# Patient Record
Sex: FEMALE | URBAN - METROPOLITAN AREA
[De-identification: names, ages, dates, MRNs, and addresses within clinical notes are randomized per-mention and may not be internally consistent; named-entity substitution may affect disease eponyms.]

---

## 2023-04-11 LAB
ALANINE AMINOTRANSFERASE (SGPT) (U/L) IN SER/PLAS: 17 U/L (ref 7–45)
ALBUMIN (G/DL) IN SER/PLAS: 4.4 G/DL (ref 3.4–5)
ALKALINE PHOSPHATASE (U/L) IN SER/PLAS: 55 U/L (ref 33–136)
ANION GAP IN SER/PLAS: 11 MMOL/L (ref 10–20)
APPEARANCE, URINE: CLEAR
ASPARTATE AMINOTRANSFERASE (SGOT) (U/L) IN SER/PLAS: 19 U/L (ref 9–39)
BILIRUBIN TOTAL (MG/DL) IN SER/PLAS: 0.5 MG/DL (ref 0–1.2)
BILIRUBIN, URINE: NEGATIVE
BLOOD, URINE: NEGATIVE
CALCIDIOL (25 OH VITAMIN D3) (NG/ML) IN SER/PLAS: 34 NG/ML
CALCIUM (MG/DL) IN SER/PLAS: 9.9 MG/DL (ref 8.6–10.6)
CARBON DIOXIDE, TOTAL (MMOL/L) IN SER/PLAS: 28 MMOL/L (ref 21–32)
CHLORIDE (MMOL/L) IN SER/PLAS: 103 MMOL/L (ref 98–107)
CHOLESTEROL (MG/DL) IN SER/PLAS: 220 MG/DL (ref 0–199)
CHOLESTEROL IN HDL (MG/DL) IN SER/PLAS: 74.2 MG/DL
CHOLESTEROL/HDL RATIO: 3
COLOR, URINE: YELLOW
CREATININE (MG/DL) IN SER/PLAS: 0.66 MG/DL (ref 0.5–1.05)
ERYTHROCYTE DISTRIBUTION WIDTH (RATIO) BY AUTOMATED COUNT: 12.8 % (ref 11.5–14.5)
ERYTHROCYTE MEAN CORPUSCULAR HEMOGLOBIN CONCENTRATION (G/DL) BY AUTOMATED: 32.5 G/DL (ref 32–36)
ERYTHROCYTE MEAN CORPUSCULAR VOLUME (FL) BY AUTOMATED COUNT: 97 FL (ref 80–100)
ERYTHROCYTES (10*6/UL) IN BLOOD BY AUTOMATED COUNT: 4.56 X10E12/L (ref 4–5.2)
ESTIMATED AVERAGE GLUCOSE FOR HBA1C: 117 MG/DL
GFR FEMALE: >90 ML/MIN/1.73M2
GLUCOSE (MG/DL) IN SER/PLAS: 99 MG/DL (ref 74–99)
GLUCOSE, URINE: NEGATIVE MG/DL
HEMATOCRIT (%) IN BLOOD BY AUTOMATED COUNT: 44.3 % (ref 36–46)
HEMOGLOBIN (G/DL) IN BLOOD: 14.4 G/DL (ref 12–16)
HEMOGLOBIN A1C/HEMOGLOBIN TOTAL IN BLOOD: 5.7 %
KETONES, URINE: NEGATIVE MG/DL
LDL: 130 MG/DL (ref 0–99)
LEUKOCYTE ESTERASE, URINE: NEGATIVE
LEUKOCYTES (10*3/UL) IN BLOOD BY AUTOMATED COUNT: 6.9 X10E9/L (ref 4.4–11.3)
NITRITE, URINE: NEGATIVE
NRBC (PER 100 WBCS) BY AUTOMATED COUNT: 0 /100 WBC (ref 0–0)
PH, URINE: 7 (ref 5–8)
PLATELETS (10*3/UL) IN BLOOD AUTOMATED COUNT: 337 X10E9/L (ref 150–450)
POTASSIUM (MMOL/L) IN SER/PLAS: 4.4 MMOL/L (ref 3.5–5.3)
PROTEIN TOTAL: 7 G/DL (ref 6.4–8.2)
PROTEIN, URINE: NEGATIVE MG/DL
SODIUM (MMOL/L) IN SER/PLAS: 138 MMOL/L (ref 136–145)
SPECIFIC GRAVITY, URINE: 1.01 (ref 1–1.03)
THYROTROPIN (MIU/L) IN SER/PLAS BY DETECTION LIMIT <= 0.05 MIU/L: 2.1 MIU/L (ref 0.44–3.98)
THYROXINE (T4) FREE (NG/DL) IN SER/PLAS: 1.01 NG/DL (ref 0.78–1.48)
TRIGLYCERIDE (MG/DL) IN SER/PLAS: 77 MG/DL (ref 0–149)
URATE (MG/DL) IN SER/PLAS: 5 MG/DL (ref 2.3–6.7)
UREA NITROGEN (MG/DL) IN SER/PLAS: 13 MG/DL (ref 6–23)
UROBILINOGEN, URINE: <2 MG/DL (ref 0–1.9)
VLDL: 15 MG/DL (ref 0–40)

## 2023-04-12 LAB — URINE CULTURE: NO GROWTH

## 2023-05-03 LAB
ANTI-DNA (DS): 3 IU/ML
C REACTIVE PROTEIN (MG/L) IN SER/PLAS: <0.1 MG/DL
RHEUMATOID FACTOR (IU/ML) IN SERUM OR PLASMA: <10 IU/ML (ref 0–15)
SEDIMENTATION RATE, ERYTHROCYTE: 8 MM/H (ref 0–30)

## 2023-05-04 LAB
ANA PATTERN: ABNORMAL
ANA TITER: ABNORMAL
ANTI-CENTROMERE: <0.2 AI
ANTI-CHROMATIN: <0.2 AI
ANTI-DNA (DS): 3 IU/ML
ANTI-JO-1 IGG: <0.2 AI
ANTI-NUCLEAR ANTIBODY (ANA): POSITIVE
ANTI-RIBOSOMAL P: <0.2 AI
ANTI-RNP: 0.6 AI
ANTI-SCL-70: <0.2 AI
ANTI-SM/RNP: <0.2 AI
ANTI-SM: <0.2 AI
ANTI-SSA: <0.2 AI
ANTI-SSB: <0.2 AI

## 2023-10-02 PROBLEM — Z79.890 LONG-TERM CURRENT USE OF TESTOSTERONE REPLACEMENT THERAPY: Status: ACTIVE | Noted: 2023-10-02

## 2023-10-02 PROBLEM — N95.2 VAGINAL ATROPHY: Status: ACTIVE | Noted: 2023-10-02

## 2023-10-02 PROBLEM — B00.9 HERPES SIMPLEX: Status: ACTIVE | Noted: 2023-10-02

## 2023-10-02 PROBLEM — R93.2 ABNORMAL LIVER SCAN: Status: ACTIVE | Noted: 2023-10-02

## 2023-10-02 PROBLEM — E78.2 HYPERLIPEMIA, MIXED: Status: ACTIVE | Noted: 2023-10-02

## 2023-10-02 PROBLEM — M85.89 OSTEOPENIA OF MULTIPLE SITES: Status: ACTIVE | Noted: 2020-10-12

## 2023-10-02 PROBLEM — M54.50 LOW BACK PAIN: Status: ACTIVE | Noted: 2023-10-02

## 2023-10-02 PROBLEM — R73.03 PRE-DIABETES: Status: ACTIVE | Noted: 2020-03-19

## 2023-10-02 PROBLEM — N95.1 MENOPAUSAL SYNDROME ON HORMONE REPLACEMENT THERAPY: Status: ACTIVE | Noted: 2023-10-02

## 2023-10-02 PROBLEM — R53.81 MALAISE: Status: ACTIVE | Noted: 2023-10-02

## 2023-10-02 PROBLEM — F32.A DEPRESSION: Status: ACTIVE | Noted: 2023-10-02

## 2023-10-02 PROBLEM — R53.83 FATIGUE: Status: ACTIVE | Noted: 2023-10-02

## 2023-10-02 PROBLEM — F41.9 ANXIETY: Status: ACTIVE | Noted: 2023-10-02

## 2023-10-02 PROBLEM — Z79.890 MENOPAUSAL SYNDROME ON HORMONE REPLACEMENT THERAPY: Status: ACTIVE | Noted: 2023-10-02

## 2023-10-02 PROBLEM — R31.29 HEMATURIA, MICROSCOPIC: Status: ACTIVE | Noted: 2023-10-02

## 2023-10-02 PROBLEM — S13.9XXA NECK SPRAIN: Status: ACTIVE | Noted: 2023-10-02

## 2023-10-02 PROBLEM — M25.551 CHRONIC PAIN OF BOTH HIPS: Status: ACTIVE | Noted: 2023-10-02

## 2023-10-02 PROBLEM — G89.29 CHRONIC PAIN OF BOTH HIPS: Status: ACTIVE | Noted: 2023-10-02
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
## 2023-10-02 PROBLEM — M25.552 CHRONIC PAIN OF BOTH HIPS: Status: ACTIVE | Noted: 2023-10-02

## 2023-10-02 RX ORDER — ESTRIOL MICRONIZED 100 %
1 POWDER (GRAM) MISCELLANEOUS
COMMUNITY

## 2023-10-02 RX ORDER — TESTOSTERONE 20.25 MG/1.25G
5 GEL TOPICAL
COMMUNITY

## 2023-10-02 RX ORDER — IMIQUIMOD 12.5 MG/.25G
CREAM TOPICAL
COMMUNITY
Start: 2022-11-25

## 2023-10-02 RX ORDER — DICLOFENAC SODIUM 75 MG/1
1 TABLET, DELAYED RELEASE ORAL 2 TIMES DAILY
COMMUNITY
Start: 2022-11-18

## 2023-10-02 RX ORDER — CLOBETASOL PROPIONATE 0.5 MG/G
OINTMENT TOPICAL
COMMUNITY
Start: 2023-07-12

## 2023-10-02 RX ORDER — ESTRADIOL 2 MG/1
2.75 TABLET ORAL
COMMUNITY

## 2023-10-02 RX ORDER — FLASH GLUCOSE SENSOR
1 KIT MISCELLANEOUS
COMMUNITY
Start: 2020-12-11

## 2023-10-02 RX ORDER — PENICILLIN V POTASSIUM 500 MG/1
TABLET, FILM COATED ORAL
COMMUNITY
Start: 2023-09-27 | End: 2023-11-15 | Stop reason: WASHOUT

## 2023-10-02 RX ORDER — CHLORHEXIDINE GLUCONATE ORAL RINSE 1.2 MG/ML
15 SOLUTION DENTAL 2 TIMES DAILY
COMMUNITY
Start: 2023-09-27 | End: 2023-10-24 | Stop reason: ALTCHOICE

## 2023-10-02 RX ORDER — ESCITALOPRAM OXALATE 10 MG/1
1 TABLET ORAL DAILY
COMMUNITY
Start: 2019-01-18 | End: 2023-11-15 | Stop reason: WASHOUT

## 2023-10-02 RX ORDER — VALACYCLOVIR HYDROCHLORIDE 500 MG/1
1 TABLET, FILM COATED ORAL DAILY
COMMUNITY
Start: 2018-06-22

## 2023-10-02 RX ORDER — ACETAMINOPHEN 500 MG
5000 TABLET ORAL EVERY MORNING
COMMUNITY

## 2023-10-02 RX ORDER — TRIAMCINOLONE ACETONIDE 40 MG/ML
INJECTION, SUSPENSION INTRA-ARTICULAR; INTRAMUSCULAR
COMMUNITY
Start: 2022-06-09

## 2023-10-02 RX ORDER — ACAR/CYST/ALA/Q10/P.SER/BROCC 800-300 MG
10 POWDER IN PACKET (EA) ORAL DAILY
COMMUNITY
Start: 2021-09-03

## 2023-10-05 ENCOUNTER — APPOINTMENT (OUTPATIENT)
Dept: PHYSICAL THERAPY | Facility: HOSPITAL | Age: 73
End: 2023-10-05
Payer: MEDICARE

## 2023-10-12 ENCOUNTER — LAB (OUTPATIENT)
Dept: LAB | Facility: LAB | Age: 73
End: 2023-10-12
Payer: MEDICARE

## 2023-10-12 DIAGNOSIS — R41.9 UNSPECIFIED SYMPTOMS AND SIGNS INVOLVING COGNITIVE FUNCTIONS AND AWARENESS: ICD-10-CM

## 2023-10-12 DIAGNOSIS — M85.80 OTHER SPECIFIED DISORDERS OF BONE DENSITY AND STRUCTURE, UNSPECIFIED SITE: ICD-10-CM

## 2023-10-12 DIAGNOSIS — R53.83 OTHER FATIGUE: Primary | ICD-10-CM

## 2023-10-13 ENCOUNTER — LAB (OUTPATIENT)
Dept: LAB | Facility: LAB | Age: 73
End: 2023-10-13
Payer: MEDICARE

## 2023-10-13 DIAGNOSIS — R53.83 OTHER FATIGUE: Primary | ICD-10-CM

## 2023-10-13 DIAGNOSIS — R53.83 OTHER FATIGUE: ICD-10-CM

## 2023-10-13 LAB
ERYTHROCYTE [DISTWIDTH] IN BLOOD BY AUTOMATED COUNT: 12.9 % (ref 11.5–14.5)
FERRITIN SERPL-MCNC: 87 NG/ML (ref 8–150)
HCT VFR BLD AUTO: 41.8 % (ref 36–46)
HGB BLD-MCNC: 13.9 G/DL (ref 12–16)
IRON SATN MFR SERPL: 38 % (ref 25–45)
IRON SERPL-MCNC: 131 UG/DL (ref 35–150)
MCH RBC QN AUTO: 32 PG (ref 26–34)
MCHC RBC AUTO-ENTMCNC: 33.3 G/DL (ref 32–36)
MCV RBC AUTO: 96 FL (ref 80–100)
NRBC BLD-RTO: 0 /100 WBCS (ref 0–0)
PLATELET # BLD AUTO: 332 X10*3/UL (ref 150–450)
PMV BLD AUTO: 10.3 FL (ref 7.5–11.5)
RBC # BLD AUTO: 4.35 X10*6/UL (ref 4–5.2)
TIBC SERPL-MCNC: 349 UG/DL (ref 240–445)
UIBC SERPL-MCNC: 218 UG/DL (ref 110–370)
VIT B12 SERPL-MCNC: 240 PG/ML (ref 211–911)
WBC # BLD AUTO: 7.6 X10*3/UL (ref 4.4–11.3)

## 2023-10-13 PROCEDURE — 83550 IRON BINDING TEST: CPT | Mod: WAIVER OF LIABILITY ON FILE

## 2023-10-13 PROCEDURE — 83540 ASSAY OF IRON: CPT | Mod: WAIVER OF LIABILITY ON FILE

## 2023-10-13 PROCEDURE — 36415 COLL VENOUS BLD VENIPUNCTURE: CPT

## 2023-10-13 PROCEDURE — 82607 VITAMIN B-12: CPT

## 2023-10-13 PROCEDURE — 82728 ASSAY OF FERRITIN: CPT | Mod: WAIVER OF LIABILITY ON FILE

## 2023-10-13 PROCEDURE — 85027 COMPLETE CBC AUTOMATED: CPT

## 2023-10-16 ENCOUNTER — APPOINTMENT (OUTPATIENT)
Dept: PHYSICAL THERAPY | Facility: HOSPITAL | Age: 73
End: 2023-10-16
Payer: MEDICARE

## 2023-10-17 NOTE — RESULT ENCOUNTER NOTE
Fitz Bee  Your vitamin b12 (even though listed in the normal range) is far too low. It could be contributing to your fatigue. I would like for you to start a vitamin b12 1000 mcg sub lingual or quick dissolve tablet daily in the morning. We will recheck this in a couple months to make sure it is improving.  Your other labs are normal. I did attempt to call you earlier today. Sorry to have missed you.   I look forward to seeing you in November.   Sidra Isidro MD PhD

## 2023-10-24 ENCOUNTER — TELEMEDICINE (OUTPATIENT)
Dept: INTEGRATIVE MEDICINE | Facility: CLINIC | Age: 73
End: 2023-10-24
Payer: MEDICARE

## 2023-10-24 DIAGNOSIS — R73.03 PRE-DIABETES: Primary | ICD-10-CM

## 2023-10-24 PROBLEM — E53.8 VITAMIN B12 DEFICIENCY: Status: ACTIVE | Noted: 2023-10-24

## 2023-10-24 PROCEDURE — 99215 OFFICE O/P EST HI 40 MIN: CPT | Performed by: INTERNAL MEDICINE

## 2023-10-24 ASSESSMENT — ENCOUNTER SYMPTOMS
BACK PAIN: 1
SLEEP DISTURBANCE: 0
COUGH: 0
DIFFICULTY URINATING: 0
APPETITE CHANGE: 0
MYALGIAS: 0
SHORTNESS OF BREATH: 0
DYSURIA: 0
FATIGUE: 0
HEADACHES: 0
NERVOUS/ANXIOUS: 0
ARTHRALGIAS: 0
WEAKNESS: 0
FEVER: 0

## 2023-10-24 NOTE — ASSESSMENT & PLAN NOTE
Patient has EXTREMELY low vitamin b12. Tried to impress importance of taking b12 daily. Recommend recheck in 3 months. Wonder if her falls might have been somewhat related to subtle proprioceptive issues form her b12 deficiency.   Reviewed other labs.

## 2023-10-24 NOTE — PATIENT INSTRUCTIONS
Suggest retest vitamin b12 in 3 months.  Keep regular with taking your vitamin d.   Follow up in 3 months.  Consume a cup of beans /lentils daily.  Focus on whole grains  Sidra Isidro MD PhD

## 2023-10-24 NOTE — ASSESSMENT & PLAN NOTE
Counseling and discussion about more fiber and less saturated fat/cholesterol in the diet. Discussed how fiber balances blood sugar.   Continue healthy exercise program and getting enough sleep.   Optimize vitamin d level.

## 2023-10-24 NOTE — PROGRESS NOTES
Integrative Medicine Follow-up Visit :     Subjective   Patient ID: Cristal Chester is a 72 y.o. female who presents for No chief complaint on file.     HPI  She had a root canal several days after seeing me. Concerned her prednisone pack may have influenced blood work. Very worried about her blood glucose. Has been all over the place in terms of hemoglobin A1c in last several years. Reminds me of strong family hx of diabetes.   She wants to talk about her vitamin b12   Wants to know if she needs to be tested for celiac.   She has a requisition to have her hemoglobin A1c and wants to know if her low b12 contributes to her  slightly high hemoglobin A1c.   Feels more bloated since eating more whole grains and beans since our last visit.   Stopped eating hard cheese. Continues with cream in her coffee.     Has a question about old blood work with Dr. Anderson. Was told she may not make enough insulin.   Eats a lot of salmon, fish on salad and wants to know if this is ok.            Pain:back pain still bothering her.     Review of Systems   Constitutional:  Negative for appetite change, fatigue and fever.   HENT:  Negative for congestion and hearing loss.    Eyes:  Negative for visual disturbance.   Respiratory:  Negative for cough and shortness of breath.    Cardiovascular:  Negative for chest pain and leg swelling.   Genitourinary:  Negative for difficulty urinating, dysuria and urgency.   Musculoskeletal:  Positive for back pain. Negative for arthralgias and myalgias.   Skin:  Negative for rash.   Neurological:  Negative for weakness and headaches.   Psychiatric/Behavioral:  Negative for behavioral problems and sleep disturbance. The patient is not nervous/anxious.                   Objective   There were no vitals taken for this visit.    Physical Exam  Constitutional:       General: She is not in acute distress.     Appearance: She is not ill-appearing, toxic-appearing or diaphoretic.   Pulmonary:      Effort:  Pulmonary effort is normal. No respiratory distress.   Musculoskeletal:         General: No deformity.      Cervical back: Normal range of motion.      Comments: Upper extremities normal range of motion grossly   Skin:     Coloration: Skin is not jaundiced or pale.      Findings: No rash.   Psychiatric:         Mood and Affect: Mood normal.         Behavior: Behavior normal.                      Assessment/Plan     Problem List Items Addressed This Visit             ICD-10-CM    Pre-diabetes - Primary R73.03     Counseling and discussion about more fiber and less saturated fat/cholesterol in the diet. Discussed how fiber balances blood sugar.   Continue healthy exercise program and getting enough sleep.   Optimize vitamin d level.               Recommend Follow up in : 3 months    Sidra Isidro MD PhD

## 2023-10-26 ENCOUNTER — APPOINTMENT (OUTPATIENT)
Dept: PHYSICAL THERAPY | Facility: HOSPITAL | Age: 73
End: 2023-10-26
Payer: MEDICARE

## 2023-11-01 ENCOUNTER — APPOINTMENT (OUTPATIENT)
Dept: INTEGRATIVE MEDICINE | Facility: CLINIC | Age: 73
End: 2023-11-01
Payer: MEDICARE

## 2023-11-02 ENCOUNTER — APPOINTMENT (OUTPATIENT)
Dept: PHYSICAL THERAPY | Facility: HOSPITAL | Age: 73
End: 2023-11-02
Payer: MEDICARE

## 2023-11-06 ENCOUNTER — TREATMENT (OUTPATIENT)
Dept: PHYSICAL THERAPY | Facility: HOSPITAL | Age: 73
End: 2023-11-06
Payer: MEDICARE

## 2023-11-06 DIAGNOSIS — M25.552 CHRONIC PAIN OF BOTH HIPS: ICD-10-CM

## 2023-11-06 DIAGNOSIS — M54.50 LOW BACK PAIN: Primary | ICD-10-CM

## 2023-11-06 DIAGNOSIS — G89.29 CHRONIC PAIN OF BOTH HIPS: ICD-10-CM

## 2023-11-06 DIAGNOSIS — M25.551 CHRONIC PAIN OF BOTH HIPS: ICD-10-CM

## 2023-11-06 PROCEDURE — 97110 THERAPEUTIC EXERCISES: CPT | Mod: GP | Performed by: PHYSICAL THERAPIST

## 2023-11-06 PROCEDURE — 97530 THERAPEUTIC ACTIVITIES: CPT | Mod: GP | Performed by: PHYSICAL THERAPIST

## 2023-11-06 ASSESSMENT — PAIN SCALES - GENERAL: PAINLEVEL_OUTOF10: 2

## 2023-11-06 ASSESSMENT — PAIN - FUNCTIONAL ASSESSMENT: PAIN_FUNCTIONAL_ASSESSMENT: 0-10

## 2023-11-06 NOTE — PROGRESS NOTES
Physical Therapy  Physical Therapy Treatment Note    Patient Name: Cristal Chester  MRN: [id number]  Today's Date: 11/6/2023  Time Calculation  Start Time: 1000  Stop Time: 1045  Time Calculation (min): 45 min    Insurance:  Visit number: 2 of 10  Authorization info: no auth   Insurance Type: medicare    General:  Reason for visit: chronic LBP and bilateral hip pain  Referred by: Dr. Prasanna Coffman MD    Current Problem  1. Low back pain        2. Chronic pain of both hips            Precautions: none      Subjective:     Patient reports she has cx her last 4 appointments secondary to having a root canal and had to travel for work. Pt has been working with [employer] [facility] and is supplementing B12 and feels as though her energy is improving. Pt reports she continues have bilateral lateral hip pain. Pt states she does not have pain with biking and yoga. Pt states walking for long periods of time continues to be uncomfortable.     Pain  Pain Assessment: 0-10  Pain Score: 2  Pain Location: Hip  Pain Orientation:  (bilateral)    Performing HEP?: Yes      Objective:   Hip strength  R: abduction 3+/5*  L:abduction 3+/5*  R:extension 3+/5  L:extension 3+/5    Pain with palpation to glute medius and minimus    Gait: bilateral trendelenburg       Treatment Performed:    Therapeutic Exercise:    15 min  Glute medius tendon loading program clamshell red TB 4 sets 12 reps each    Therapeutic Activity :    30 min  Majority of time spent on reassessment  Education on possible pathology  Education on tendon loading program  Education tendonesis vs tendonitis         Assessment:   Pt continues to present with impairments noted at initial eval. Pt has not been in the clinic secondary to dental procedure. Pt is going to start tendon loading program and follow up in 2 weeks      Plan:  Follow up in 2 weeks      Ben Cobian PT

## 2023-11-07 ASSESSMENT — ENCOUNTER SYMPTOMS
OCCASIONAL FEELINGS OF UNSTEADINESS: 0
LOSS OF SENSATION IN FEET: 0
DEPRESSION: 0

## 2023-11-13 ENCOUNTER — APPOINTMENT (OUTPATIENT)
Dept: INTEGRATIVE MEDICINE | Facility: CLINIC | Age: 73
End: 2023-11-13
Payer: MEDICARE

## 2023-11-15 ENCOUNTER — PATIENT MESSAGE (OUTPATIENT)
Dept: INTEGRATIVE MEDICINE | Facility: CLINIC | Age: 73
End: 2023-11-15

## 2023-11-15 ENCOUNTER — TELEMEDICINE (OUTPATIENT)
Dept: INTEGRATIVE MEDICINE | Facility: CLINIC | Age: 73
End: 2023-11-15
Payer: MEDICARE

## 2023-11-15 DIAGNOSIS — E53.8 VITAMIN B12 DEFICIENCY: Primary | ICD-10-CM

## 2023-11-15 DIAGNOSIS — M85.89 OSTEOPENIA OF MULTIPLE SITES: ICD-10-CM

## 2023-11-15 DIAGNOSIS — R73.03 PRE-DIABETES: Primary | ICD-10-CM

## 2023-11-15 PROCEDURE — 99215 OFFICE O/P EST HI 40 MIN: CPT | Performed by: INTERNAL MEDICINE

## 2023-11-15 ASSESSMENT — ENCOUNTER SYMPTOMS
APPETITE CHANGE: 0
FEVER: 0
MYALGIAS: 0
SHORTNESS OF BREATH: 0
DIFFICULTY URINATING: 0
BACK PAIN: 0
COUGH: 0
DYSURIA: 0
FATIGUE: 0
SLEEP DISTURBANCE: 0
ARTHRALGIAS: 0
WEAKNESS: 0
NERVOUS/ANXIOUS: 0
HEADACHES: 0

## 2023-11-15 NOTE — PATIENT INSTRUCTIONS
Increase vitamin b12 to 2000 mcg daily.   Check hemoglobin A1c  Consider seeing Dr. Parveen Higuera for back issues.   Kick Diabetes by Yanelis Mensah RD  Plan recheck vitamin b12 in 3 months.   Start taking vitamin d 5000 international units every other day.       Follow up 2 months.   Sidra Isidro MD PhD

## 2023-11-15 NOTE — PROGRESS NOTES
Integrative Medicine Follow-up Visit :     Subjective   Patient ID: Cristal Chester is a 72 y.o. female who presents for Fatigue and Prediabetes       Fatigue: a bit better since starting vitmain b12.   Still worried about her elevated blood sugar. Would like to know where she is starting from.   Pre-Diabetes: Still eat beans very regularly. Has hummus regularly. Was on a macrobiotic diet in the past. Does not tolerate oatmeal. She feels bloated and stuffed. Struggling with whole grains.     She is taking the vitamin b12 1000 mcg every morning under the tongue. No issues.     Still eating some cheese. Has decreased dairy by 80%. Eating more tofu.   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     Wants to know when to have hemoglobin A1c. Very worried she might not need the carbs that I am suggested that she eat.      Continues to exercise. Still serg a great deal of stiffness- she is working with PT who is trying to help her stretch her psoas muscle.   Pain:still stiff.     Review of Systems   Constitutional:  Negative for appetite change, fatigue and fever.   HENT:  Negative for congestion and hearing loss.    Eyes:  Negative for visual disturbance.   Respiratory:  Negative for cough and shortness of breath.    Cardiovascular:  Negative for chest pain and leg swelling.   Genitourinary:  Negative for difficulty urinating, dysuria and urgency.   Musculoskeletal:  Negative for arthralgias, back pain and myalgias.   Skin:  Negative for rash.   Neurological:  Negative for weakness and headaches.   Psychiatric/Behavioral:  Negative for behavioral problems and sleep disturbance. The patient is not nervous/anxious.                   Objective   There were no vitals taken for this visit.    Physical Exam  Constitutional:       General: She is not in acute distress.     Appearance: She is not ill-appearing, toxic-appearing or diaphoretic.   Pulmonary:      Effort: Pulmonary effort is normal. No respiratory distress.    Musculoskeletal:         General: No deformity.      Cervical back: Normal range of motion.      Comments: Upper extremities normal range of motion grossly   Skin:     Coloration: Skin is not jaundiced or pale.      Findings: No rash.   Psychiatric:         Mood and Affect: Mood normal.         Behavior: Behavior normal.                      Assessment/Plan     Problem List Items Addressed This Visit             ICD-10-CM    Osteopenia of multiple sites M85.89    Relevant Orders    Vitamin D 25-Hydroxy,Total (for eval of Vitamin D levels)    Pre-diabetes - Primary R73.03    Relevant Orders    Hemoglobin A1C         Recommend Follow up in : 2-3 months.    Sidra Isidro MD PhD

## 2023-11-20 ENCOUNTER — TREATMENT (OUTPATIENT)
Dept: PHYSICAL THERAPY | Facility: HOSPITAL | Age: 73
End: 2023-11-20
Payer: MEDICARE

## 2023-11-20 ENCOUNTER — LAB (OUTPATIENT)
Dept: LAB | Facility: LAB | Age: 73
End: 2023-11-20
Payer: MEDICARE

## 2023-11-20 ENCOUNTER — TELEPHONE (OUTPATIENT)
Dept: INTERNAL MEDICINE | Facility: HOSPITAL | Age: 73
End: 2023-11-20

## 2023-11-20 DIAGNOSIS — M54.50 LOW BACK PAIN: Primary | ICD-10-CM

## 2023-11-20 DIAGNOSIS — G89.29 CHRONIC PAIN OF BOTH HIPS: ICD-10-CM

## 2023-11-20 DIAGNOSIS — R73.03 PRE-DIABETES: ICD-10-CM

## 2023-11-20 DIAGNOSIS — E53.8 VITAMIN B12 DEFICIENCY: ICD-10-CM

## 2023-11-20 DIAGNOSIS — M25.552 CHRONIC PAIN OF BOTH HIPS: ICD-10-CM

## 2023-11-20 DIAGNOSIS — M85.89 OSTEOPENIA OF MULTIPLE SITES: ICD-10-CM

## 2023-11-20 DIAGNOSIS — M25.551 CHRONIC PAIN OF BOTH HIPS: ICD-10-CM

## 2023-11-20 DIAGNOSIS — R73.03 PRE-DIABETES: Primary | ICD-10-CM

## 2023-11-20 LAB
25(OH)D3 SERPL-MCNC: 53 NG/ML (ref 30–100)
EST. AVERAGE GLUCOSE BLD GHB EST-MCNC: 108 MG/DL
HBA1C MFR BLD: 5.4 %

## 2023-11-20 PROCEDURE — 83036 HEMOGLOBIN GLYCOSYLATED A1C: CPT

## 2023-11-20 PROCEDURE — 97530 THERAPEUTIC ACTIVITIES: CPT | Mod: GP | Performed by: PHYSICAL THERAPIST

## 2023-11-20 PROCEDURE — 36415 COLL VENOUS BLD VENIPUNCTURE: CPT

## 2023-11-20 PROCEDURE — 97110 THERAPEUTIC EXERCISES: CPT | Mod: GP | Performed by: PHYSICAL THERAPIST

## 2023-11-20 PROCEDURE — 82306 VITAMIN D 25 HYDROXY: CPT

## 2023-11-20 RX ORDER — CYANOCOBALAMIN 1000 UG/ML
INJECTION, SOLUTION INTRAMUSCULAR; SUBCUTANEOUS
Qty: 12 ML | Refills: 0 | Status: SHIPPED | OUTPATIENT
Start: 2023-11-20 | End: 2024-02-20

## 2023-11-20 ASSESSMENT — PAIN - FUNCTIONAL ASSESSMENT: PAIN_FUNCTIONAL_ASSESSMENT: 0-10

## 2023-11-20 ASSESSMENT — PAIN SCALES - GENERAL: PAINLEVEL_OUTOF10: 2

## 2023-11-20 NOTE — PROGRESS NOTES
"  Physical Therapy  Physical Therapy Treatment Note    Patient Name: Cristal Chester  MRN: [id number]  Today's Date: 11/20/2023       Insurance:  Visit number: 3 of 10  Authorization info: no auth   Insurance Type: medicare    General:  Reason for visit: chronic LBP and bilateral hip pain  Referred by: Dr. Prasanna Coffman MD    Current Problem  1. Low back pain        2. Chronic pain of both hips              Precautions: none      Subjective:   Pt arrived to therapy reporting she does not feel as though she is worse but maybe not better. Pt states she was sore following glute medius loading with clamshells. Pt reports she was able to go through the routine about 90% prescribed due to soreness.     Pain  Pain Assessment: 0-10  Pain Score: 2    Performing HEP?: Yes      Objective:   Hip strength  R: abduction 3+/5*  L:abduction 3+/5*  R:extension 3+/5  L:extension 3+/5    Pain with palpation to glute medius and minimus    Gait: bilateral trendelenburg       Treatment Performed:    Therapeutic Exercise:    25 min  Glute medius tendon loading program clamshell red TB 4 sets 12 reps each review  Single leg bridge 3x8 each  Table top 90/90 core isometric 5\" hold    Therapeutic Activity :    15 min  Majority of time spent on reassessment  Education on possible pathology  Education on tendon loading program  Education tendonesis vs tendonitis         Assessment:   Pt felt improvement in familiar discomfort following table top core exercise. We are going to continue to work core and pelvic stability with posterior chain activation to improve pain and overall function    Plan:  Follow up in 2 weeks      Ben Cobian, PT   "

## 2023-11-20 NOTE — TELEPHONE ENCOUNTER
----- Message from Franchesca Recinos MA sent at 11/20/2023 11:56 AM EST -----  Regarding: b-12 and hga1c  Patient called and the A!C test that is in her chart is for future(feb) Can you please put a new order in for one for today. The blood was drawn already . Also she would like the B-12 injections called in to her pharmacy     I have put in another order for hemoglobin A1c. I had asked the patient at the visit to determine if the vitamin b12 injections would be covered prior to ordering them. From her other direct message to me, it looks like she has not called to determine this. Would appreciate if patient could determine if she can get them prior to me ordering them. However, given that she has asked, I will order them.   Sidra Isidro MD PhD

## 2023-11-21 NOTE — RESULT ENCOUNTER NOTE
Your hemoglobin A1c has improved a bit since last checked. Your vitamin d is in an ideal level. Let me know if you have questions.   Sidra Isidro MD PhD

## 2023-12-02 RX ORDER — CYANOCOBALAMIN 1000 UG/ML
1000 INJECTION, SOLUTION INTRAMUSCULAR; SUBCUTANEOUS
Qty: 4 ML | Refills: 1 | Status: SHIPPED | OUTPATIENT
Start: 2023-12-02 | End: 2024-01-31

## 2023-12-04 ENCOUNTER — TREATMENT (OUTPATIENT)
Dept: PHYSICAL THERAPY | Facility: HOSPITAL | Age: 73
End: 2023-12-04
Payer: MEDICARE

## 2023-12-04 DIAGNOSIS — M54.50 LOW BACK PAIN: Primary | ICD-10-CM

## 2023-12-04 DIAGNOSIS — M25.552 CHRONIC PAIN OF BOTH HIPS: ICD-10-CM

## 2023-12-04 DIAGNOSIS — M25.551 CHRONIC PAIN OF BOTH HIPS: ICD-10-CM

## 2023-12-04 DIAGNOSIS — G89.29 CHRONIC PAIN OF BOTH HIPS: ICD-10-CM

## 2023-12-04 PROCEDURE — 97110 THERAPEUTIC EXERCISES: CPT | Mod: GP | Performed by: PHYSICAL THERAPIST

## 2023-12-04 PROCEDURE — 97140 MANUAL THERAPY 1/> REGIONS: CPT | Mod: GP | Performed by: PHYSICAL THERAPIST

## 2023-12-04 ASSESSMENT — PAIN - FUNCTIONAL ASSESSMENT: PAIN_FUNCTIONAL_ASSESSMENT: 0-10

## 2023-12-04 ASSESSMENT — PAIN SCALES - GENERAL: PAINLEVEL_OUTOF10: 1

## 2023-12-04 NOTE — PROGRESS NOTES
"  Physical Therapy  Physical Therapy Treatment Note    Patient Name: Cristal Chester  MRN: [id number]  Today's Date: 11/20/2023  Time Calculation  Start Time: 1005  Stop Time: 1050  Time Calculation (min): 45 min    Insurance:  Visit number: 4 of 10  Authorization info: no auth   Insurance Type: medicare    General:  Reason for visit: chronic LBP and bilateral hip pain  Referred by: Dr. Prasanna Coffman MD    Current Problem  1. Low back pain        2. Chronic pain of both hips                Precautions: none      Subjective:   Pt arrived to therapy reporting she is not feeling too bad today. Pt reports she has been more active since last visit. Pt has been doing yoga and noticed some balance issues and some knee pain. Pt has also been hiking which has been good. Pt had some \"body work\" done over the weekend    Pain  Pain Assessment: 0-10  Pain Score: 1    Performing HEP?: Yes      Objective:   Pain with lumbar flexion left SI      Treatment Performed:  Therapeutic Exercise:    25 min  STM adductor group and piriformis      Therapeutic Exercise:    15 min  Glute medius tendon loading program clamshell red TB 4 sets 12 reps each review  Single leg bridge 3x8 each  Table top 90/90 core isometric 5\" hold  90/90 hip openers on floor          Assessment:   Pt had less pain compared to previous session. Pt to continue to focus hip strength and hip mobility. Pt to follow up in 2 weeks with discharge    Plan:  Follow up in 2 weeks      Ben Cobian PT   "

## 2023-12-06 ENCOUNTER — APPOINTMENT (OUTPATIENT)
Dept: INTEGRATIVE MEDICINE | Facility: CLINIC | Age: 73
End: 2023-12-06
Payer: MEDICARE

## 2023-12-18 ENCOUNTER — TREATMENT (OUTPATIENT)
Dept: PHYSICAL THERAPY | Facility: HOSPITAL | Age: 73
End: 2023-12-18
Payer: MEDICARE

## 2023-12-18 DIAGNOSIS — G89.29 CHRONIC PAIN OF BOTH HIPS: ICD-10-CM

## 2023-12-18 DIAGNOSIS — M25.552 CHRONIC PAIN OF BOTH HIPS: ICD-10-CM

## 2023-12-18 DIAGNOSIS — M54.50 LOW BACK PAIN: Primary | ICD-10-CM

## 2023-12-18 DIAGNOSIS — M25.551 CHRONIC PAIN OF BOTH HIPS: ICD-10-CM

## 2023-12-18 PROCEDURE — 97110 THERAPEUTIC EXERCISES: CPT | Mod: GP | Performed by: PHYSICAL THERAPIST

## 2023-12-18 PROCEDURE — 97112 NEUROMUSCULAR REEDUCATION: CPT | Mod: GP | Performed by: PHYSICAL THERAPIST

## 2023-12-18 ASSESSMENT — PAIN - FUNCTIONAL ASSESSMENT: PAIN_FUNCTIONAL_ASSESSMENT: 0-10

## 2023-12-18 ASSESSMENT — PAIN SCALES - GENERAL: PAINLEVEL_OUTOF10: 0 - NO PAIN

## 2023-12-18 NOTE — PROGRESS NOTES
"  Physical Therapy  Physical Therapy Treatment Note    Patient Name: Cristal Chester  MRN: [id number]  Today's Date: 12/18/2023  Time Calculation  Start Time: 1000  Stop Time: 1045  Time Calculation (min): 45 min    Insurance:  Visit number: 5 of 10  Authorization info: no auth   Insurance Type: medicare    General:  Reason for visit: chronic LBP and bilateral hip pain  Referred by: Dr. Prasanna Coffman MD    Current Problem  1. Low back pain        2. Chronic pain of both hips                Precautions: none      Subjective:   Pt arrived to therapy reporting  she was going on a walk approximately 10 days ago. Pt went on a long walk. Pt states this was a longer walk that required a walk on [facility]. Pt states she had pain in bilateral hips and down legs. Pt reports she was able to walk about 10,000 step with not much pain. Pt reports compliance with HEP. Pt reports anti-inflammatories help to calm down pain    Pain  Pain Assessment: 0-10  Pain Score: 0 - No pain    Performing HEP?: Yes      Objective:         Treatment Performed:  NMR:    25 min  Long discussion on central sensitivity and pain science   Discussed movement patterns  Discussed reciprocal inhibition        Therapeutic Exercise:    15 min    Mostly verbal review of below  Glute medius tendon loading program clamshell red TB 4 sets 12 reps each review  Single leg bridge 3x8 each  Table top 90/90 core isometric 5\" hold  90/90 hip openers on floor  Modified side plank      Assessment:   Pt is progressing but slowly. Pt going out of town and POC will be put on hold until March 2024 when she returns      Plan:  hold      Ben Cobian PT   "

## 2024-03-07 ENCOUNTER — LAB (OUTPATIENT)
Dept: LAB | Facility: LAB | Age: 74
End: 2024-03-07
Payer: MEDICARE

## 2024-03-07 DIAGNOSIS — R73.03 PRE-DIABETES: ICD-10-CM

## 2024-03-07 DIAGNOSIS — E53.8 VITAMIN B12 DEFICIENCY: ICD-10-CM

## 2024-03-07 LAB
EST. AVERAGE GLUCOSE BLD GHB EST-MCNC: 103 MG/DL
HBA1C MFR BLD: 5.2 %
VIT B12 SERPL-MCNC: 1011 PG/ML (ref 211–911)

## 2024-03-07 PROCEDURE — 82607 VITAMIN B-12: CPT

## 2024-03-07 PROCEDURE — 36415 COLL VENOUS BLD VENIPUNCTURE: CPT

## 2024-03-07 PROCEDURE — 83036 HEMOGLOBIN GLYCOSYLATED A1C: CPT

## 2024-03-11 ENCOUNTER — APPOINTMENT (OUTPATIENT)
Dept: PHYSICAL THERAPY | Facility: HOSPITAL | Age: 74
End: 2024-03-11
Payer: MEDICARE

## 2024-03-12 ENCOUNTER — APPOINTMENT (OUTPATIENT)
Dept: INTEGRATIVE MEDICINE | Facility: CLINIC | Age: 74
End: 2024-03-12
Payer: MEDICARE

## 2024-03-12 ENCOUNTER — TELEMEDICINE (OUTPATIENT)
Dept: INTEGRATIVE MEDICINE | Facility: CLINIC | Age: 74
End: 2024-03-12
Payer: MEDICARE

## 2024-03-12 DIAGNOSIS — M25.552 CHRONIC PAIN OF BOTH HIPS: Primary | ICD-10-CM

## 2024-03-12 DIAGNOSIS — R73.03 PRE-DIABETES: ICD-10-CM

## 2024-03-12 DIAGNOSIS — G89.29 CHRONIC BILATERAL LOW BACK PAIN WITHOUT SCIATICA: ICD-10-CM

## 2024-03-12 DIAGNOSIS — M54.59 OTHER LOW BACK PAIN: ICD-10-CM

## 2024-03-12 DIAGNOSIS — M25.551 CHRONIC PAIN OF BOTH HIPS: Primary | ICD-10-CM

## 2024-03-12 DIAGNOSIS — M85.89 OSTEOPENIA OF MULTIPLE SITES: ICD-10-CM

## 2024-03-12 DIAGNOSIS — E78.2 HYPERLIPEMIA, MIXED: ICD-10-CM

## 2024-03-12 DIAGNOSIS — M54.50 CHRONIC BILATERAL LOW BACK PAIN WITHOUT SCIATICA: ICD-10-CM

## 2024-03-12 DIAGNOSIS — G89.29 CHRONIC PAIN OF BOTH HIPS: Primary | ICD-10-CM

## 2024-03-12 DIAGNOSIS — E53.8 VITAMIN B12 DEFICIENCY: ICD-10-CM

## 2024-03-12 PROCEDURE — 1126F AMNT PAIN NOTED NONE PRSNT: CPT | Performed by: INTERNAL MEDICINE

## 2024-03-12 PROCEDURE — 99215 OFFICE O/P EST HI 40 MIN: CPT | Performed by: INTERNAL MEDICINE

## 2024-03-12 ASSESSMENT — ENCOUNTER SYMPTOMS
FEVER: 0
WEAKNESS: 0
APPETITE CHANGE: 0
DIFFICULTY URINATING: 0
MYALGIAS: 0
COUGH: 0
DYSURIA: 0
SHORTNESS OF BREATH: 0
ARTHRALGIAS: 1
HEADACHES: 0
FATIGUE: 1
SLEEP DISTURBANCE: 0
BACK PAIN: 1
NERVOUS/ANXIOUS: 0

## 2024-03-12 NOTE — PATIENT INSTRUCTIONS
Continue the vitamin b12 1000- 2000 mcg sublingual daily. Complete the one remain b12 injection.  Recommend b12 be testing yearly.   Contact me if you begin to get a slump with your energy and we will restart the vitamin b12 injections 1-2 time per month  Start omega 3 DHA/EPA supplement daily.   Acupuncture for joint pain and low back pain.  [phone] for scheduling.   Follow up in six months. Recheck b12 level at this point.   See Dr. Sheri Isidro MD PhD

## 2024-03-12 NOTE — PROGRESS NOTES
Integrative Medicine Follow-up Visit :     Subjective   Patient ID: Cristal Chester is a 73 y.o. female who presents for No chief complaint on file.       HPI  Here to review b12 levels. Was taking the vitamin b12 once weekly as an injection. Also taking vitamin b12 2000 mcg sublingual. She noticed that her fatigue has improved.  No episodes of severe fatigue.  She gets tired at end of the day but it is different.       Continues to have hip and shoulder pain.     Continues to be primarily plant based but eats eggs, fish as she wants. Intuitive eating.   Eats rye bread on a regular basis. Eats lots of fruits and vegetables.        Pain:still pain in right shoulder and hips are still bothering. Continues to have low back pain which spreads to her hips. Very active. Would like to continue work with PT. Found one that she thinks is helpful at . Would like referral. Acupuncture has been helpful in the past for her low back pain, hip pain.  Went to acupuncturist outside our system.       Review of Systems   Constitutional:  Positive for fatigue (improved). Negative for appetite change and fever.   HENT:  Negative for congestion and hearing loss.    Eyes:  Negative for visual disturbance.   Respiratory:  Negative for cough and shortness of breath.    Cardiovascular:  Negative for chest pain and leg swelling.   Genitourinary:  Negative for difficulty urinating, dysuria and urgency.   Musculoskeletal:  Positive for arthralgias and back pain. Negative for myalgias.   Skin:  Negative for rash.   Neurological:  Negative for weakness and headaches.   Psychiatric/Behavioral:  Negative for behavioral problems and sleep disturbance. The patient is not nervous/anxious.                   Objective   There were no vitals taken for this visit.    Physical Exam  Constitutional:       General: She is not in acute distress.     Appearance: She is not ill-appearing, toxic-appearing or diaphoretic.   Pulmonary:      Effort: Pulmonary  effort is normal. No respiratory distress.   Musculoskeletal:         General: No deformity.      Cervical back: Normal range of motion.      Comments: Upper extremities normal range of motion grossly   Skin:     Coloration: Skin is not jaundiced or pale.      Findings: No rash.   Psychiatric:         Mood and Affect: Mood normal.         Behavior: Behavior normal.                      Assessment/Plan     Problem List Items Addressed This Visit             ICD-10-CM    Chronic pain of both hips - Primary M25.551, M25.552, G89.29    Relevant Orders    Referral to Physical Therapy    Hyperlipemia, mixed E78.2    Relevant Orders    Referral to Primary Care    Low back pain M54.50    Relevant Orders    Referral to Physical Therapy    Osteopenia of multiple sites M85.89    Relevant Orders    Referral to Primary Care    Pre-diabetes R73.03    Relevant Orders    Referral to Primary Care    Vitamin B12 deficiency E53.8     Stop weekly b12 injections after she completes this most recent dose. Level has reached optimal range. Suggest continue sublingual b12 and pt to message me if she notices her slumps in energy return. We can start again the b12 injections perhaps every 2-4 weeks to support her levels. Would check level again in six months to document that it is remaining between 700-1500 pg/ml.   Patient questions answered.   Given plant based diet recommend DHA/EPA supplement for brain health and possibly to improve omega 9/omega 3 ratios which may improve inflammation/pain.           Other Visit Diagnoses         Codes    Other low back pain     M54.59    Relevant Orders    Referral to [facility]              Recommend Follow up in : six months.     Sidra Isidro MD PhD    Time Spent  Prep time on day of patient encounter: 5 minutes  Time spent directly with patient, family or caregiver: 30 minutes  Additional Time Spent on Patient Care Activities: 0 minutes  Documentation Time: 5 minutes  Other Time Spent:  0 minutes  Total: 40 minutes                             operating room

## 2024-03-12 NOTE — ASSESSMENT & PLAN NOTE
Stop weekly b12 injections after she completes this most recent dose. Level has reached optimal range. Suggest continue sublingual b12 and pt to message me if she notices her slumps in energy return. We can start again the b12 injections perhaps every 2-4 weeks to support her levels. Would check level again in six months to document that it is remaining between 700-1500 pg/ml.   Patient questions answered.   Given plant based diet recommend DHA/EPA supplement for brain health and possibly to improve omega 9/omega 3 ratios which may improve inflammation/pain.

## 2024-04-02 ENCOUNTER — EVALUATION (OUTPATIENT)
Dept: PHYSICAL THERAPY | Facility: HOSPITAL | Age: 74
End: 2024-04-02
Payer: MEDICARE

## 2024-04-02 DIAGNOSIS — M54.50 CHRONIC BILATERAL LOW BACK PAIN WITHOUT SCIATICA: ICD-10-CM

## 2024-04-02 DIAGNOSIS — G89.29 CHRONIC BILATERAL LOW BACK PAIN WITHOUT SCIATICA: ICD-10-CM

## 2024-04-02 DIAGNOSIS — M25.551 CHRONIC PAIN OF BOTH HIPS: ICD-10-CM

## 2024-04-02 DIAGNOSIS — G89.29 CHRONIC PAIN OF BOTH HIPS: ICD-10-CM

## 2024-04-02 DIAGNOSIS — M25.552 CHRONIC PAIN OF BOTH HIPS: ICD-10-CM

## 2024-04-02 PROCEDURE — 97161 PT EVAL LOW COMPLEX 20 MIN: CPT | Mod: GP | Performed by: PHYSICAL THERAPIST

## 2024-04-02 PROCEDURE — 97110 THERAPEUTIC EXERCISES: CPT | Mod: GP | Performed by: PHYSICAL THERAPIST

## 2024-04-02 ASSESSMENT — PAIN SCALES - GENERAL: PAINLEVEL_OUTOF10: 1

## 2024-04-02 ASSESSMENT — PAIN - FUNCTIONAL ASSESSMENT: PAIN_FUNCTIONAL_ASSESSMENT: 0-10

## 2024-04-02 NOTE — PROGRESS NOTES
Physical Therapy  Physical Therapy Orthopedic Evaluation    Patient Name: Cristal Chester  MRN: [id number]  Today's Date: 4/3/2024  Time Calculation  Start Time: 1000  Stop Time: 1055  Time Calculation (min): 55 min    Insurance:  Visit number: 1 of 10  Authorization info: sign POC  Insurance Type: Medicare    General:  Reason for visit: bilateral hip pain  Referred by: Sidra Isidro MD PhD    Current Problem:  1. Chronic pain of both hips  Referral to Physical Therapy    Follow Up In Physical Therapy      2. Chronic bilateral low back pain without sciatica  Referral to Physical Therapy    Follow Up In Physical Therapy          Precautions: none  Precautions  STEADI Fall Risk Score (The score of 4 or more indicates an increased risk of falling): 0      Medical History Form: Reviewed (scanned into chart)    Subjective:     Chief Complaint: Patient presents to clinic as a formal patient with a chief complaint of bilateral hip pain. Pt also has complaint of right shoulder pain which will be addressed at a later date after she gets MRI. Pt is a past patient with similar complaints of bilateral lateral hip pain R>L. Pt reports she notices her pain mostly with long periods of walking. Pt states she last visit she no longer has catching in hip when going up stairs. Pt has not been in the clinic secondary to being in [place]  Onset Date: 12 years ago  MEME: Fall    Current Condition:   Better    Pain:  Pain Assessment: 0-10  Pain Score: 1  Pain Type: Chronic pain  Pain Location: Hip  Pain Orientation: Right, Left  Location: bilateral hips  Description: ache  Aggravating Factors:  Walking  Relieving Factors:  Rest and Stretching    Relevant Information (PMH & Previous Tests/Imaging): upcoming MRI of shoulder  Previous Interventions/Treatments: Physical Therapy    Prior Level of Function (PLOF)  Patient previously independent with all ADLs  Exercise/Physical Activity: active with walking, biking, yoga, swimming  Work/School:  therapist    Patients Living Environment: Reviewed and no concern    Primary Language: English    Patient's Goal(s) for Therapy: to alleviate pain with exercise    Red Flags: Do you have any of the following? No  Fever/chills, unexplained weight changes, dizziness/fainting, unexplained change in bowel or bladder functions, unexplained malaise or muscle weakness, night pain/sweats, numbness or tingling    Objective:    Hip Musculoskeletal Exam  Gait    Trendelenburg: right and left    Palpation    Right      Tenderness: present (glue medius and minimus, TFL, piriformis, adductors)    Left      Tenderness: present (glue medius and minimus, TFL, piriformis, adductor)    Range of Motion    Right      Passive flexion: 120.       Passive internal rotation: 40 (pain).       Passive external rotation: 45.       Passive abduction: 45.     Left      Passive flexion: 120.       Passive internal rotation: 40 (pain).       Passive external rotation: 45.       Passive abduction: 45.     Strength    Right      Extension: 4-/5.       Flexion: 4-/5.       Internal rotation: 4-/5. Internal rotation is affected by pain.       External rotation: 4-/5.       Abduction: 4-/5.     Left      Extension: 4-/5.       Flexion: 4-/5.       Internal rotation: 4-/5. Internal rotation is affected by pain.       External rotation: 4-/5.       Adduction: 4-/5.       Abduction: 4-/5.     Special Tests    Right      SELINA test (right): positive      Internal rotation: positive    Left      SELINA test (left): positive      Internal rotation: positive      Spine Musculoskeletal Exam    Gait    Trendelenburg: right and left    Range of Motion    Thoracolumbar       Flexion: normal.     Extension: normal.       Right      Lateral bending: normal.       Lateral rotation: normal.       Left      Lateral bending: normal.       Lateral rotation: normal.      Strength    Thoracolumbar       Right      Hip flexion: 4-/5.        Left      Hip flexion: 4-/5.        Hip adduction: 4-/5.      Special Tests    Thoracolumbar      Right      SELINA test: positive      Left      SELINA test: positive           Outcome Measures:  Other Measures  Oswestry Disablity Index (VIKTORIYA): 22       EDUCATION: Home exercise program, plan of care, activity modifications, pain management, and injury pathology       Goals: Set and discussed today  Active       PT Problem       PT Goal 1       Start:  04/03/24    Expected End:  05/29/24       By discharge, patient will:  Demonstrate independence with home exercise program  Tolerate increased exercise without adverse reaction  Demonstrate improved posture & proper body mechanics throughout session  Increase bilateral hip ROM to pain free + WNL  Increase bilateral hip strength to pain free + 5/5 throughout  Report decrease in pain by > 2 points to meet the MCID  Decrease score of Modified VIKTORIYA by > 6 points to meet the MCID  Perform ADLs without an increase symptoms  Ascend / descend stairs without an increase in symptoms  Ambulate x 2 miles without an increase in symptoms               Plan of care was developed with input and agreement by the patient    Treatment Performed:    Therapeutic Exercise:    15 min  Long discussion on possible pathologies and imporance of pelvic stability and rotator strength  Review of her mobility workouts  Seated resisted hip IR with yellow TB loop and yoga block        Assessment: Patient presents with signs and symptoms consistent with 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 Pt would benefit from physical therapy to address the impairments found & listed previously in the objective section in order to return to safe and pain-free ADLs and prior level of function.         Plan:     Planned Interventions include: therapeutic exercise, self-care home management, manual therapy, therapeutic activities,  gait training, neuromuscular coordination, vasopneumatic, dry needling, aquatic therapy  Frequency: Every Other Week  Duration: 8 Weeks      Ben Cobian, PT

## 2024-04-02 NOTE — Clinical Note
April 3, 2024    Ben Cobian PT  [address] [facility]  [address]  [facility] [address]    Patient: Cristal Chester   YOB: 1950   Date of Visit: 4/2/2024       Dear Sidra Isidro MD PhD  [address] [place] [address]   [place] [address]    The attached plan of care is being sent to you because your patient’s medical reimbursement requires that you certify the plan of care. Your signature is required to allow uninterrupted insurance coverage.      You may indicate your approval by signing below and faxing this form back to us at Dept Fax: [phone].    Please call Dept: [phone] with any questions or concerns.    Thank you for this referral,        Ben Cobian PT  [facility]  [address] [facility]  [place] [address] [phone]    Payer: Payor: MEDICARE / Plan: MEDICARE PART A AND B / Product Type: *No Product type* /                                                                         Date:     Dear Ben Cobian PT,     Re: Ms. Cristal Chester, MRN:[id number]    I certify that I have reviewed the attached plan of care and it is medically necessary for Ms. Cristal Chester (1950) who is under my care.          ______________________________________                    _________________  Provider name and credentials                                           Date and time                                                                                           Plan of Care 4/2/24   Effective from: 4/2/2024  Effective to: 7/2/2024    Active  Plan ID: [id number]           Participants as of 4/3/2024    Name Type Comments Contact Info    Sidra Isidro MD PhD PCP - [facility] Attributed Provider  [phone]    Ben Cobian PT Physical Therapist  [phone]      Last Plan Note     Author: Ben Cobian PT Status: Signed Last edited: 4/2/2024 10:00 AM         Physical Therapy  Physical Therapy Orthopedic Evaluation    Patient Name: Cristal Chester  MRN: [id number]  Today's  Date: 4/3/2024  Time Calculation  Start Time: 1000  Stop Time: 1055  Time Calculation (min): 55 min    Insurance:  Visit number: 1 of 10  Authorization info: sign POC  Insurance Type: Medicare    General:  Reason for visit: bilateral hip pain  Referred by: Sidra Isidro MD PhD    Current Problem:  1. Chronic pain of both hips  Referral to Physical Therapy    Follow Up In Physical Therapy      2. Chronic bilateral low back pain without sciatica  Referral to Physical Therapy    Follow Up In Physical Therapy          Precautions: none  Precautions  STEADI Fall Risk Score (The score of 4 or more indicates an increased risk of falling): 0      Medical History Form: Reviewed (scanned into chart)    Subjective:     Chief Complaint: Patient presents to clinic as a formal patient with a chief complaint of bilateral hip pain. Pt also has complaint of right shoulder pain which will be addressed at a later date after she gets MRI. Pt is a past patient with similar complaints of bilateral lateral hip pain R>L. Pt reports she notices her pain mostly with long periods of walking. Pt states she last visit she no longer has catching in hip when going up stairs. Pt has not been in the clinic secondary to being in [place]  Onset Date: 12 years ago  MEME: Fall    Current Condition:   Better    Pain:  Pain Assessment: 0-10  Pain Score: 1  Pain Type: Chronic pain  Pain Location: Hip  Pain Orientation: Right, Left  Location: bilateral hips  Description: ache  Aggravating Factors:  Walking  Relieving Factors:  Rest and Stretching    Relevant Information (PMH & Previous Tests/Imaging): upcoming MRI of shoulder  Previous Interventions/Treatments: Physical Therapy    Prior Level of Function (PLOF)  Patient previously independent with all ADLs  Exercise/Physical Activity: active with walking, biking, yoga, swimming  Work/School: therapist    Patients Living Environment: Reviewed and no concern    Primary Language: English    Patient's Goal(s)  for Therapy: to alleviate pain with exercise    Red Flags: Do you have any of the following? No  Fever/chills, unexplained weight changes, dizziness/fainting, unexplained change in bowel or bladder functions, unexplained malaise or muscle weakness, night pain/sweats, numbness or tingling    Objective:    Hip Musculoskeletal Exam  Gait    Trendelenburg: right and left    Palpation    Right      Tenderness: present (glue medius and minimus, TFL, piriformis, adductors)    Left      Tenderness: present (glue medius and minimus, TFL, piriformis, adductor)    Range of Motion    Right      Passive flexion: 120.       Passive internal rotation: 40 (pain).       Passive external rotation: 45.       Passive abduction: 45.     Left      Passive flexion: 120.       Passive internal rotation: 40 (pain).       Passive external rotation: 45.       Passive abduction: 45.     Strength    Right      Extension: 4-/5.       Flexion: 4-/5.       Internal rotation: 4-/5. Internal rotation is affected by pain.       External rotation: 4-/5.       Abduction: 4-/5.     Left      Extension: 4-/5.       Flexion: 4-/5.       Internal rotation: 4-/5. Internal rotation is affected by pain.       External rotation: 4-/5.       Adduction: 4-/5.       Abduction: 4-/5.     Special Tests    Right      SELINA test (right): positive      Internal rotation: positive    Left      SELINA test (left): positive      Internal rotation: positive      Spine Musculoskeletal Exam    Gait    Trendelenburg: right and left    Range of Motion    Thoracolumbar       Flexion: normal.     Extension: normal.       Right      Lateral bending: normal.       Lateral rotation: normal.       Left      Lateral bending: normal.       Lateral rotation: normal.      Strength    Thoracolumbar       Right      Hip flexion: 4-/5.        Left      Hip flexion: 4-/5.       Hip adduction: 4-/5.      Special Tests    Thoracolumbar      Right      SELINA test: positive      Left      SELINA  test: positive           Outcome Measures:  Other Measures  Oswestry Disablity Index (VIKTORIYA): 22       EDUCATION: Home exercise program, plan of care, activity modifications, pain management, and injury pathology       Goals: Set and discussed today  Active       PT Problem       PT Goal 1       Start:  04/03/24    Expected End:  05/29/24       By discharge, patient will:  Demonstrate independence with home exercise program  Tolerate increased exercise without adverse reaction  Demonstrate improved posture & proper body mechanics throughout session  Increase bilateral hip ROM to pain free + WNL  Increase bilateral hip strength to pain free + 5/5 throughout  Report decrease in pain by > 2 points to meet the MCID  Decrease score of Modified VIKTORIYA by > 6 points to meet the MCID  Perform ADLs without an increase symptoms  Ascend / descend stairs without an increase in symptoms  Ambulate x 2 miles without an increase in symptoms               Plan of care was developed with input and agreement by the patient    Treatment Performed:    Therapeutic Exercise:    15 min  Long discussion on possible pathologies and imporance of pelvic stability and rotator strength  Review of her mobility workouts  Seated resisted hip IR with yellow TB loop and yoga block        Assessment: Patient presents with signs and symptoms consistent with 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 Pt would benefit from physical therapy to address the impairments found & listed previously in the objective section in order to return to safe and pain-free ADLs and prior level of function.         Plan:     Planned Interventions include: therapeutic exercise, self-care home management, manual therapy, therapeutic activities, gait training, neuromuscular coordination, vasopneumatic, dry needling, aquatic therapy  Frequency: Every Other  Week  Duration: 8 Weeks      Ben Cobian, PT

## 2024-04-22 ENCOUNTER — TREATMENT (OUTPATIENT)
Dept: PHYSICAL THERAPY | Facility: HOSPITAL | Age: 74
End: 2024-04-22
Payer: MEDICARE

## 2024-04-22 DIAGNOSIS — M54.50 CHRONIC BILATERAL LOW BACK PAIN WITHOUT SCIATICA: ICD-10-CM

## 2024-04-22 DIAGNOSIS — G89.29 CHRONIC PAIN OF BOTH HIPS: ICD-10-CM

## 2024-04-22 DIAGNOSIS — M25.552 CHRONIC PAIN OF BOTH HIPS: ICD-10-CM

## 2024-04-22 DIAGNOSIS — G89.29 CHRONIC BILATERAL LOW BACK PAIN WITHOUT SCIATICA: ICD-10-CM

## 2024-04-22 DIAGNOSIS — M25.551 CHRONIC PAIN OF BOTH HIPS: ICD-10-CM

## 2024-04-22 PROCEDURE — 97110 THERAPEUTIC EXERCISES: CPT | Mod: GP | Performed by: PHYSICAL THERAPIST

## 2024-04-22 ASSESSMENT — PAIN SCALES - GENERAL: PAINLEVEL_OUTOF10: 0 - NO PAIN

## 2024-04-22 ASSESSMENT — PAIN - FUNCTIONAL ASSESSMENT: PAIN_FUNCTIONAL_ASSESSMENT: 0-10

## 2024-04-22 NOTE — PROGRESS NOTES
Physical Therapy  Physical Therapy Treatment Note    Patient Name: Cristal Chester  MRN: [id number]  Today's Date: 4/22/2024  Time Calculation  Start Time: 1015  Stop Time: 1100  Time Calculation (min): 45 min    Insurance:  Visit number: 2 of 10  Authorization info: sign POC  Insurance Type: Medicare     General:  Reason for visit: bilateral hip pain  Referred by: Sidra Isidro MD PhD    Current Problem  1. Chronic pain of both hips  Follow Up In Physical Therapy      2. Chronic bilateral low back pain without sciatica  Follow Up In Physical Therapy          Precautions:       Subjective:     Patient reports she did have an MRI on her right at [facility] and per patient. She continues to have adhesive capsulitis and some fraying of the RTC. Pt was prescribed anti inflammatories and it is also helping the hips. Pt has follow up with non op sports med at [facility] next week.    Pain  Pain Assessment: 0-10  Pain Score: 0 - No pain  Pain Location: Hip    Performing HEP?: Partially      Objective:   Hip ROM WFL pain free bilateral  Hip strength 5/5 pain free throughout      Treatment Performed:    Therapeutic Exercise:    45 min  Reassessment  Glute bridge with orange TB loop x12  Glute bridge with orange TB loop 5-5-5 unilateral hip abduction  S/l clamshell orange TB loop 2x12  Seated hip IR with yoga block and orange TB loop        Assessment:   Pt is doing great overall. Pt states anti-inflammatories are helping. I provided patient with a good hip routine to work on at home. At this time pt is going to perform HEP independently       Plan:  Discontinue POC. Will keep open for 30 days incase she needs to follow up      Ben Cobian, PT

## 2024-06-13 ENCOUNTER — PATIENT MESSAGE (OUTPATIENT)
Dept: INTEGRATIVE MEDICINE | Facility: CLINIC | Age: 74
End: 2024-06-13
Payer: MEDICARE

## 2024-06-25 ENCOUNTER — APPOINTMENT (OUTPATIENT)
Dept: INTEGRATIVE MEDICINE | Facility: CLINIC | Age: 74
End: 2024-06-25
Payer: MEDICARE

## 2024-06-25 DIAGNOSIS — M54.59 OTHER LOW BACK PAIN: ICD-10-CM

## 2024-06-25 PROCEDURE — 97810 ACUP 1/> WO ESTIM 1ST 15 MIN: CPT | Performed by: INTERNAL MEDICINE

## 2024-06-25 PROCEDURE — 97811 ACUP 1/> W/O ESTIM EA ADD 15: CPT | Performed by: INTERNAL MEDICINE

## 2024-06-25 NOTE — PROGRESS NOTES
Acupuncture Visit:     Subjective   Patient ID: Cristal Chester is a 73 y.o. female who presents for No chief complaint on file.  1.Medicare A & B   12 visits in 90 days + add 8 days if improve   Initial : 6/25/24: 90 days 9/25/24  LINDA  1/12 + 8 ADD    States she ruptured a disc in her 30's- L4-5  Better bolster under knees, buttocks, certain yoga poses, francia pose, down dog  Better - advil (1x/wk) 10 min after waking and moving around, advil   Worse - in am (at times can't stand up straight), laying down  Worse - doing too much gardening  Anti inflam Rx on and off  Notes her bones are good- no arthritis- no bursitis   States her tendons are an issue area around her hips, Pain at head of femur   Notes ROM of mm around hips are fine  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  Pn level - dull ache  Exercise - 3x/wk, used to cycle(too difficult   Notes more pain at tensor fasciae latae today since walking alot    RIGHT shoulder adhesive capsulitis from a fall 2 yrs ago, frozen shoulder 4-5 months after  2 steroid injections in shoulder because pain was waking her up at night  Rx mobic, stopped dt side effects    All organs intact    Rx BHRT- oral biest sand  progesterone caps    Diet is clean - eans, nuts/seeds, fish, red meat 1x/wk, keto x 1 yr, states vegan does not work for her  States occ high HBA1C, low B 12, fruit nuts, yogurt,          Session Information  Is this acupuncture treatment being billed to the patient's insurance company: Yes  This is visit number: 1  The patient has a total number of visits of: 12  Initial Acupuncture Treatment date: 06/25/24  Last Treatment date: 09/25/24  Name of Insurance Company: Med [employer]  Name of Supervising Physician: LINDA         Review of Systems         Provider reviewed plan for the acupuncture session, precautions and contraindications. Patient/guardian/hospital staff has given consent to treat with full understanding of what to expect during the session. Before  acupuncture began, provider explained to the patient to communicate at any time if the procedure was causing discomfort past their tolerance level. Patient agreed to advise acupuncturist. The acupuncturist counseled the patient on the risks of acupuncture treatment including pain, infection, bleeding, and no relief of pain. The patient was positioned comfortably. There was no evidence of infection at the site of needle insertions.    Objective   Physical Exam              Acupuncture Treatment  Body Points - Bilateral: Kd 3, LI 4, Lv 3, Lv 8-T, 77.18, GB 34, UB 62  Other Techniques Utilized: TDP Lamp  TDP Lamp Descripton: feet  Needle Count In: 14  Needle Count Out: 14  Total Face to Face Time (min): 35 minutes              Assessment/Plan

## 2024-06-26 ENCOUNTER — APPOINTMENT (OUTPATIENT)
Dept: INTEGRATIVE MEDICINE | Facility: CLINIC | Age: 74
End: 2024-06-26
Payer: MEDICARE

## 2024-06-26 ENCOUNTER — PATIENT MESSAGE (OUTPATIENT)
Dept: INTEGRATIVE MEDICINE | Facility: CLINIC | Age: 74
End: 2024-06-26

## 2024-06-26 DIAGNOSIS — M25.551 BILATERAL HIP PAIN: Primary | ICD-10-CM

## 2024-06-26 DIAGNOSIS — M25.552 BILATERAL HIP PAIN: Primary | ICD-10-CM

## 2024-07-15 ENCOUNTER — APPOINTMENT (OUTPATIENT)
Dept: INTEGRATIVE MEDICINE | Facility: CLINIC | Age: 74
End: 2024-07-15
Payer: MEDICARE

## 2024-07-29 ENCOUNTER — APPOINTMENT (OUTPATIENT)
Dept: INTEGRATIVE MEDICINE | Facility: CLINIC | Age: 74
End: 2024-07-29
Payer: MEDICARE

## 2024-08-06 ENCOUNTER — APPOINTMENT (OUTPATIENT)
Dept: INTEGRATIVE MEDICINE | Facility: CLINIC | Age: 74
End: 2024-08-06
Payer: MEDICARE

## 2024-08-07 ENCOUNTER — APPOINTMENT (OUTPATIENT)
Dept: INTEGRATIVE MEDICINE | Facility: CLINIC | Age: 74
End: 2024-08-07
Payer: MEDICARE

## 2024-08-13 ENCOUNTER — APPOINTMENT (OUTPATIENT)
Dept: INTEGRATIVE MEDICINE | Facility: CLINIC | Age: 74
End: 2024-08-13
Payer: MEDICARE

## 2024-08-20 ENCOUNTER — APPOINTMENT (OUTPATIENT)
Dept: INTEGRATIVE MEDICINE | Facility: CLINIC | Age: 74
End: 2024-08-20
Payer: MEDICARE

## 2024-08-22 ENCOUNTER — PATIENT MESSAGE (OUTPATIENT)
Dept: INTEGRATIVE MEDICINE | Facility: CLINIC | Age: 74
End: 2024-08-22
Payer: MEDICARE

## 2024-08-27 ENCOUNTER — APPOINTMENT (OUTPATIENT)
Dept: INTEGRATIVE MEDICINE | Facility: CLINIC | Age: 74
End: 2024-08-27
Payer: MEDICARE

## 2024-09-03 ENCOUNTER — APPOINTMENT (OUTPATIENT)
Dept: INTEGRATIVE MEDICINE | Facility: CLINIC | Age: 74
End: 2024-09-03
Payer: MEDICARE

## 2024-09-04 ENCOUNTER — APPOINTMENT (OUTPATIENT)
Dept: INTEGRATIVE MEDICINE | Facility: CLINIC | Age: 74
End: 2024-09-04
Payer: MEDICARE

## 2024-09-10 ENCOUNTER — APPOINTMENT (OUTPATIENT)
Dept: INTEGRATIVE MEDICINE | Facility: CLINIC | Age: 74
End: 2024-09-10
Payer: MEDICARE

## 2024-09-11 ENCOUNTER — APPOINTMENT (OUTPATIENT)
Dept: INTEGRATIVE MEDICINE | Facility: CLINIC | Age: 74
End: 2024-09-11
Payer: MEDICARE

## 2024-09-11 DIAGNOSIS — E53.8 VITAMIN B12 DEFICIENCY: ICD-10-CM

## 2024-09-11 DIAGNOSIS — M25.551 CHRONIC PAIN OF BOTH HIPS: ICD-10-CM

## 2024-09-11 DIAGNOSIS — G89.29 CHRONIC PAIN OF BOTH HIPS: ICD-10-CM

## 2024-09-11 DIAGNOSIS — E55.9 VITAMIN D DEFICIENCY: ICD-10-CM

## 2024-09-11 DIAGNOSIS — M75.01 ADHESIVE CAPSULITIS OF RIGHT SHOULDER: ICD-10-CM

## 2024-09-11 DIAGNOSIS — M25.552 CHRONIC PAIN OF BOTH HIPS: ICD-10-CM

## 2024-09-11 DIAGNOSIS — R53.82 CHRONIC FATIGUE: Primary | ICD-10-CM

## 2024-09-11 PROCEDURE — 99215 OFFICE O/P EST HI 40 MIN: CPT | Performed by: INTERNAL MEDICINE

## 2024-09-11 ASSESSMENT — ENCOUNTER SYMPTOMS
SHORTNESS OF BREATH: 0
APPETITE CHANGE: 0
FEVER: 0
ARTHRALGIAS: 1
BACK PAIN: 1
COUGH: 0
DYSURIA: 0
FATIGUE: 1
CONSTIPATION: 0
DIFFICULTY URINATING: 0
MYALGIAS: 0
HEADACHES: 0
SLEEP DISTURBANCE: 0
DIARRHEA: 0
WEAKNESS: 0
NERVOUS/ANXIOUS: 0

## 2024-09-11 NOTE — PROGRESS NOTES
Integrative Medicine Follow-up Visit :     Subjective   Patient ID: Cristal Chester is a 73 y.o. female who presents for No chief complaint on file.       HPI  Still having struggles with insomnia. Especially worried right now due to political issues in the [place].     Fatigue: she feels better after taking vitamin b12 injections. She completed them. She takes sublingual b12 1000 mcg every day but her fatigue has decreased. She gave herself the injections. She is getting 8-9 hours of sleep. Says she is a good sleeper. Pain improves with sleep. Finds chamomile tea helpful for her sleep if she has issues falling asleep.     Still having pain in her right shoulder. Not getting better. She is working on range of motion. Saw someone at  and they were helpful. Reinjured her shoulder when biking recently also.   Has not been able to see the same PT because they only do post op patients only.  Is seeing a retired PT who is a friend that is helping her. Still struggling with pain in low back/ glute area. She is getting acupuncture on her own.   She tried acupuncture at [facility] once. It was ok but she felt the acupuncture was too sterile because it was in a hospital setting.     Was struggling with dizziness. After massage and after OM manipulation by Dr. Higuera and during a yoga class. Improved after epley maneuver.   She is not taking vitamin d regularly.      Fam hx: denies thyroid problems in immediate family.   Pain:shifting pain. Sometimes shoulder, sometimes low back.     Review of Systems   Constitutional:  Positive for fatigue (improved). Negative for appetite change and fever.   HENT:  Negative for congestion and hearing loss.    Eyes:  Negative for visual disturbance.   Respiratory:  Negative for cough and shortness of breath.    Cardiovascular:  Negative for chest pain and leg swelling.   Gastrointestinal:  Negative for constipation and diarrhea.   Genitourinary:  Negative for difficulty urinating, dysuria and  urgency.   Musculoskeletal:  Positive for arthralgias and back pain. Negative for myalgias.   Skin:  Negative for rash.   Neurological:  Negative for weakness and headaches.   Psychiatric/Behavioral:  Negative for behavioral problems and sleep disturbance. The patient is not nervous/anxious.                   Objective   There were no vitals taken for this visit.    Physical Exam  Constitutional:       General: She is not in acute distress.     Appearance: She is not ill-appearing, toxic-appearing or diaphoretic.   Pulmonary:      Effort: Pulmonary effort is normal. No respiratory distress.   Musculoskeletal:         General: No deformity.      Cervical back: Normal range of motion.      Comments: Upper extremities normal range of motion grossly   Skin:     Coloration: Skin is not jaundiced or pale.      Findings: No rash.   Psychiatric:         Mood and Affect: Mood normal.         Behavior: Behavior normal.                      Assessment/Plan     Problem List Items Addressed This Visit             ICD-10-CM    Adhesive capsulitis of right shoulder M75.01    Relevant Orders    Referral to Physical Therapy    Chronic pain of both hips M25.551, M25.552, G89.29    Relevant Orders    Referral to Physical Therapy    Fatigue - Primary R53.83    Relevant Orders    Vitamin B12    Thyroid Stimulating Hormone    Vitamin B12 deficiency E53.8    Relevant Orders    Vitamin B12     Other Visit Diagnoses         Codes    Vitamin D deficiency     E55.9    Relevant Orders    Vitamin D 25-Hydroxy,Total (for eval of Vitamin D levels)          Stop b12 for two days before the blood test.   I will send you instructions on what to do if b12 level is below 700 pg/ml.   Get back to exercise.   Follow up with shoulder orthopedics. Consider PT at .     Recommend Follow up in : six months     Sidra Isidro MD PhD    Time Spent  Prep time on day of patient encounter: 5 minutes  Time spent directly with patient, family or caregiver: 30  minutes  Additional Time Spent on Patient Care Activities: 0 minutes  Documentation Time: 5 minutes  Other Time Spent: 0 minutes  Total: 40 minutes

## 2024-09-11 NOTE — PATIENT INSTRUCTIONS
Stop b12 for two days before the blood test.   I will send you instructions on what to do if b12 level is below 700 pg/ml.   Get back to exercise.   Follow up with shoulder orthopedics. Consider PT at .

## 2024-09-17 ENCOUNTER — APPOINTMENT (OUTPATIENT)
Dept: INTEGRATIVE MEDICINE | Facility: CLINIC | Age: 74
End: 2024-09-17
Payer: MEDICARE

## 2024-09-24 ENCOUNTER — APPOINTMENT (OUTPATIENT)
Dept: INTEGRATIVE MEDICINE | Facility: CLINIC | Age: 74
End: 2024-09-24
Payer: MEDICARE

## 2024-09-25 ENCOUNTER — APPOINTMENT (OUTPATIENT)
Dept: INTEGRATIVE MEDICINE | Facility: CLINIC | Age: 74
End: 2024-09-25
Payer: MEDICARE

## 2024-09-25 ENCOUNTER — LAB (OUTPATIENT)
Dept: LAB | Facility: LAB | Age: 74
End: 2024-09-25
Payer: MEDICARE

## 2024-09-25 DIAGNOSIS — R53.82 CHRONIC FATIGUE: ICD-10-CM

## 2024-09-25 DIAGNOSIS — E53.8 VITAMIN B12 DEFICIENCY: ICD-10-CM

## 2024-09-25 DIAGNOSIS — E55.9 VITAMIN D DEFICIENCY: ICD-10-CM

## 2024-09-25 LAB
25(OH)D3 SERPL-MCNC: 63 NG/ML (ref 30–100)
TSH SERPL-ACNC: 1.56 MIU/L (ref 0.44–3.98)
VIT B12 SERPL-MCNC: 898 PG/ML (ref 211–911)

## 2024-09-25 PROCEDURE — 82306 VITAMIN D 25 HYDROXY: CPT

## 2024-09-25 PROCEDURE — 36415 COLL VENOUS BLD VENIPUNCTURE: CPT

## 2024-09-25 PROCEDURE — 82607 VITAMIN B-12: CPT

## 2024-09-25 PROCEDURE — 84443 ASSAY THYROID STIM HORMONE: CPT

## 2024-10-09 ENCOUNTER — APPOINTMENT (OUTPATIENT)
Dept: INTEGRATIVE MEDICINE | Facility: CLINIC | Age: 74
End: 2024-10-09
Payer: MEDICARE

## 2024-10-09 PROCEDURE — MASS60G MASSAGE 60 MINUTES: Performed by: MASSAGE THERAPIST

## 2024-10-09 NOTE — PROGRESS NOTES
"Massage Therapy Visit:     Cristal Chester was referred by Dr Isidro .    Condition of Client Subjective :    Patient ID: Cristal Chester is a 73 y.o. female who presents for reason for visit of left side hip and low back discomfort.    Cristal has trained with [employer] and was a bodyworker, understands different modalities and what has been most and least effective (OMT, massage, PT) for her issues. Job as psychotherapist.    HPI: rt shoulder injured, mild tears. past \"frozen shoulder\" that improved but was re-injured with gardening activity. Lt TFL, SI, gluteal region discomfort.    Acupuncture with [facility] has been beneficial and swimming several times a week.    Previous activities include dancing, biking, running.  Pt has been very active and athletic, health conscious, but activity has been impacted due to shoulder injury and subsequent pain.    Session Information  Visit Type: New patient  Description of present complaint: Chronic pain    Objective   Pre-treatment Assessment  Arrival Mode: Ambulatory  Treatment Precautions: None      Actions Assessment/Plan :  Provider reviewed plan for the massage session, precautions and contraindications. Patient/guardian/hospital staff has given consent to treat with full understanding of what to expect during the session. Before massage therapy began, provider explained to the patient to communicate at any time if the pressure was causing discomfort past their tolerance level. Patient agreed to advise therapist.    Massage Treatment  Patient Position: Table, Supine, Side-lying, Prone  Positioning Assistance: Other (specify), Pillow(s)/bolster under anles while prone, Pillow(s)/bolster under knees while supine  Massage Technique: Myofascial release  Area/Body Region: major focus on left side gluteals, lumbar, lower thoracic region while prone. supine mfr to left iliopsoas. brief mfr to each shoulder.  Pressure Scale: 2 - Mild pressure, 3 - Medium pressure    Due to rt " shoulder discomfort and injury, side lying on the rt is difficult.     Response:   The tissue treated at left side lumbar softened nicely with mfr.     Evaluation:

## 2024-10-11 ENCOUNTER — PATIENT MESSAGE (OUTPATIENT)
Dept: INTEGRATIVE MEDICINE | Facility: CLINIC | Age: 74
End: 2024-10-11
Payer: MEDICARE

## 2024-12-05 ENCOUNTER — APPOINTMENT (OUTPATIENT)
Dept: INTEGRATIVE MEDICINE | Facility: CLINIC | Age: 74
End: 2024-12-05
Payer: MEDICARE

## 2025-03-12 ENCOUNTER — APPOINTMENT (OUTPATIENT)
Dept: INTEGRATIVE MEDICINE | Facility: CLINIC | Age: 75
End: 2025-03-12
Payer: MEDICARE